# Patient Record
Sex: MALE | Race: WHITE | NOT HISPANIC OR LATINO | URBAN - METROPOLITAN AREA
[De-identification: names, ages, dates, MRNs, and addresses within clinical notes are randomized per-mention and may not be internally consistent; named-entity substitution may affect disease eponyms.]

---

## 2017-08-17 ENCOUNTER — OFFICE VISIT (OUTPATIENT)
Dept: FAMILY MEDICINE CLINIC | Facility: CLINIC | Age: 22
End: 2017-08-17

## 2017-08-17 VITALS
WEIGHT: 195 LBS | BODY MASS INDEX: 27.92 KG/M2 | SYSTOLIC BLOOD PRESSURE: 130 MMHG | HEIGHT: 70 IN | TEMPERATURE: 98.6 F | OXYGEN SATURATION: 99 % | HEART RATE: 115 BPM | RESPIRATION RATE: 16 BRPM | DIASTOLIC BLOOD PRESSURE: 72 MMHG

## 2017-08-17 DIAGNOSIS — J02.9 SORE THROAT: Primary | ICD-10-CM

## 2017-08-17 DIAGNOSIS — J06.9 ACUTE URI: ICD-10-CM

## 2017-08-17 LAB
EXPIRATION DATE: NORMAL
INTERNAL CONTROL: NORMAL
Lab: NORMAL
S PYO AG THROAT QL: NEGATIVE

## 2017-08-17 PROCEDURE — 87880 STREP A ASSAY W/OPTIC: CPT | Performed by: NURSE PRACTITIONER

## 2017-08-17 PROCEDURE — 99203 OFFICE O/P NEW LOW 30 MIN: CPT | Performed by: NURSE PRACTITIONER

## 2017-08-17 RX ORDER — AZITHROMYCIN 250 MG/1
TABLET, FILM COATED ORAL
Qty: 6 TABLET | Refills: 0 | OUTPATIENT
Start: 2017-08-17 | End: 2022-07-18

## 2017-08-17 NOTE — PROGRESS NOTES
Subjective   Roger Gaitan is a 21 y.o. male.     History of Present Illness   Roger Gaitan 21 y.o. male who presents today for a new patient appointment.    he has a history of There is no problem list on file for this patient.  .  he is here for their URI symptoms I reviewed the PFSH recorded today by my MA/LPN staff.   he   He has been feeling well until recent illness.      Roger Gaitan 21 y.o. male who presents for evaluation of sore throat. Symptoms include congestion, myalgias, fever, chills, runny nose and pain while swallowing.  Onset of symptoms was 2 days ago, gradually worsening since that time. Patient denies shortness of breath, wheezing.   Evaluation to date: none Treatment to date:  Advil and nasal saline washes.     The following portions of the patient's history were reviewed and updated as appropriate: allergies, current medications, past family history, past medical history, past social history, past surgical history and problem list.    Review of Systems   Constitutional: Positive for chills, fatigue and fever.   HENT: Positive for congestion, postnasal drip, rhinorrhea and sore throat. Negative for ear pain, sinus pressure and trouble swallowing.    Respiratory: Positive for cough.        Objective   Physical Exam   Constitutional: He is oriented to person, place, and time. He appears well-developed and well-nourished.   HENT:   Right Ear: Tympanic membrane, external ear and ear canal normal.   Left Ear: Tympanic membrane, external ear and ear canal normal.   Nose: Nose normal. Right sinus exhibits no maxillary sinus tenderness and no frontal sinus tenderness. Left sinus exhibits no maxillary sinus tenderness and no frontal sinus tenderness.   Mouth/Throat: Uvula is midline and oropharynx is clear and moist. Tonsils are 3+ on the right. Tonsils are 3+ on the left.   Cardiovascular: Normal rate and regular rhythm.    Pulmonary/Chest: Effort normal and breath sounds normal.   Neurological:  He is oriented to person, place, and time.   Skin: Skin is warm and dry.   Psychiatric: He has a normal mood and affect. His behavior is normal. Judgment and thought content normal.   Nursing note and vitals reviewed.      Assessment/Plan   Roger was seen today for sore throat and conjunctivitis.    Diagnoses and all orders for this visit:    Sore throat  -     POCT rapid strep A  -     azithromycin (ZITHROMAX Z-KALYANI) 250 MG tablet; Take 2 tablets the first day, then 1 tablet daily for 4 days.    Acute URI  -     azithromycin (ZITHROMAX Z-KALYANI) 250 MG tablet; Take 2 tablets the first day, then 1 tablet daily for 4 days.        Patient to start abx if symptoms worsen or do not improve within the next 4 days.